# Patient Record
(demographics unavailable — no encounter records)

---

## 2025-01-15 NOTE — HISTORY OF PRESENT ILLNESS
[FreeTextEntry1] : RAJAN BAEZA returns to the office today. He is a 61 year-old man who underwent a radical prostatectomy in February 2023.Surgical pathology showed Jaleel 3+4 adenocarcinoma, pT2 N0 MX with indeterminate margins.  His PSA levels have been undetectable since his surgery.    Urinary function: Stream is strong, minimal stress incontinence, usually dry.  No requirements of pads or diapers.  Sexual function: Partial erections, Tadalafil not helpful to date. He has also tried Sildenafil. He is unhappy with the results. Not interested in injections.  He saw Dr. Allen to discuss a penile implant.

## 2025-01-15 NOTE — DISEASE MANAGEMENT
[1] : T1 [c] : c [0-10] : 0 -10 ng/mL [Biopsy with Fusion] : Patient had a biopsy with fusion on [Biopsy results sent to PCP/Referring Physician] : Biopsy results sent to PCP/Referring Physician [] : Patient had a Prostate MRI [3] : 3 [BiopsyDate] : 10/22 [TotalCores] : 15 [TotalPositiveCores] : 9 [MaxCoreInvolvement] : 40 [IIB] : IIB [Radical Prostatectomy] : Radical Prostatectomy [Patient had a radical prostatectomy] : Patient had a radical prostatectomy  [7(3+4)] : Jaleel Score 7(3+4) [Indeterminate] : Indeterminate margins [2] : T2 [0] : N0 [X] : MX [RadicalProstatectomyDate] : 02/23 [TotalNumberofnodesresected] : 13 [PositiveNodes] : 0